# Patient Record
Sex: FEMALE | Race: BLACK OR AFRICAN AMERICAN | NOT HISPANIC OR LATINO | URBAN - METROPOLITAN AREA
[De-identification: names, ages, dates, MRNs, and addresses within clinical notes are randomized per-mention and may not be internally consistent; named-entity substitution may affect disease eponyms.]

---

## 2021-03-28 ENCOUNTER — HOSPITAL ENCOUNTER (OUTPATIENT)
Dept: RADIOLOGY | Facility: CLINIC | Age: 36
Discharge: HOME | End: 2021-03-28
Attending: STUDENT IN AN ORGANIZED HEALTH CARE EDUCATION/TRAINING PROGRAM
Payer: COMMERCIAL

## 2021-03-28 ENCOUNTER — OFFICE VISIT (OUTPATIENT)
Dept: URGENT CARE | Facility: CLINIC | Age: 36
End: 2021-03-28
Payer: COMMERCIAL

## 2021-03-28 ENCOUNTER — HOSPITAL ENCOUNTER (OUTPATIENT)
Dept: RADIOLOGY | Facility: CLINIC | Age: 36
Discharge: HOME | End: 2021-03-28
Attending: STUDENT IN AN ORGANIZED HEALTH CARE EDUCATION/TRAINING PROGRAM

## 2021-03-28 VITALS
HEART RATE: 94 BPM | WEIGHT: 135 LBS | OXYGEN SATURATION: 98 % | RESPIRATION RATE: 16 BRPM | DIASTOLIC BLOOD PRESSURE: 68 MMHG | SYSTOLIC BLOOD PRESSURE: 125 MMHG | HEIGHT: 64 IN | TEMPERATURE: 98 F | BODY MASS INDEX: 23.05 KG/M2

## 2021-03-28 DIAGNOSIS — S46.819A STRAIN OF TRAPEZIUS MUSCLE, UNSPECIFIED LATERALITY, INITIAL ENCOUNTER: ICD-10-CM

## 2021-03-28 DIAGNOSIS — M54.50 ACUTE MIDLINE LOW BACK PAIN WITHOUT SCIATICA: ICD-10-CM

## 2021-03-28 DIAGNOSIS — G44.319 ACUTE POST-TRAUMATIC HEADACHE, NOT INTRACTABLE: ICD-10-CM

## 2021-03-28 DIAGNOSIS — S69.92XA INJURY OF LEFT RING FINGER, INITIAL ENCOUNTER: ICD-10-CM
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
## 2021-03-28 PROCEDURE — 72100 X-RAY EXAM L-S SPINE 2/3 VWS: CPT | Mod: S$GLB,,, | Performed by: RADIOLOGY

## 2021-03-28 PROCEDURE — 72100 XR LUMBAR SPINE 2 OR 3 VIEWS: ICD-10-PCS | Mod: S$GLB,,, | Performed by: RADIOLOGY

## 2021-03-28 PROCEDURE — 99204 PR OFFICE/OUTPT VISIT, NEW, LEVL IV, 45-59 MIN: ICD-10-PCS | Mod: S$GLB,,, | Performed by: STUDENT IN AN ORGANIZED HEALTH CARE EDUCATION/TRAINING PROGRAM

## 2021-03-28 PROCEDURE — 1125F AMNT PAIN NOTED PAIN PRSNT: CPT | Mod: S$GLB,,, | Performed by: STUDENT IN AN ORGANIZED HEALTH CARE EDUCATION/TRAINING PROGRAM

## 2021-03-28 PROCEDURE — 1125F PR PAIN SEVERITY QUANTIFIED, PAIN PRESENT: ICD-10-PCS | Mod: S$GLB,,, | Performed by: STUDENT IN AN ORGANIZED HEALTH CARE EDUCATION/TRAINING PROGRAM

## 2021-03-28 PROCEDURE — 73140 X-RAY EXAM OF FINGER(S): CPT | Mod: S$GLB,,, | Performed by: RADIOLOGY

## 2021-03-28 PROCEDURE — 73140 XR FINGER 2 OR MORE VIEWS: ICD-10-PCS | Mod: S$GLB,,, | Performed by: RADIOLOGY

## 2021-03-28 PROCEDURE — 3008F PR BODY MASS INDEX (BMI) DOCUMENTED: ICD-10-PCS | Mod: CPTII,S$GLB,, | Performed by: STUDENT IN AN ORGANIZED HEALTH CARE EDUCATION/TRAINING PROGRAM

## 2021-03-28 PROCEDURE — 99204 OFFICE O/P NEW MOD 45 MIN: CPT | Mod: S$GLB,,, | Performed by: STUDENT IN AN ORGANIZED HEALTH CARE EDUCATION/TRAINING PROGRAM

## 2021-03-28 PROCEDURE — 3008F BODY MASS INDEX DOCD: CPT | Mod: CPTII,S$GLB,, | Performed by: STUDENT IN AN ORGANIZED HEALTH CARE EDUCATION/TRAINING PROGRAM

## 2021-03-28 RX ORDER — DICLOFENAC POTASSIUM 50 MG/1
50 TABLET, FILM COATED ORAL 2 TIMES DAILY PRN
Qty: 10 TABLET | Refills: 0 | Status: SHIPPED | OUTPATIENT
Start: 2021-03-28 | End: 2021-04-02

## 2021-03-28 RX ORDER — CYCLOBENZAPRINE HCL 5 MG
5-10 TABLET ORAL 3 TIMES DAILY PRN
Qty: 15 TABLET | Refills: 0 | Status: SHIPPED | OUTPATIENT
Start: 2021-03-28 | End: 2021-04-02

## 2025-04-19 ENCOUNTER — APPOINTMENT (OUTPATIENT)
Dept: LAB | Facility: HOSPITAL | Age: 40
End: 2025-04-19
Attending: PEDIATRICS
Payer: COMMERCIAL

## 2025-04-19 ENCOUNTER — OFFICE VISIT (OUTPATIENT)
Dept: INTERNAL MEDICINE | Facility: CLINIC | Age: 40
End: 2025-04-19
Payer: COMMERCIAL

## 2025-04-19 VITALS
DIASTOLIC BLOOD PRESSURE: 70 MMHG | TEMPERATURE: 98 F | OXYGEN SATURATION: 99 % | BODY MASS INDEX: 23.34 KG/M2 | HEIGHT: 64 IN | RESPIRATION RATE: 18 BRPM | SYSTOLIC BLOOD PRESSURE: 120 MMHG | HEART RATE: 89 BPM | WEIGHT: 136.69 LBS

## 2025-04-19 DIAGNOSIS — Z23 NEED FOR TDAP VACCINATION: ICD-10-CM

## 2025-04-19 DIAGNOSIS — Z01.419 WELL WOMAN EXAM: ICD-10-CM

## 2025-04-19 DIAGNOSIS — Z00.00 WELL ADULT EXAM: Primary | ICD-10-CM

## 2025-04-19 PROCEDURE — 3074F SYST BP LT 130 MM HG: CPT | Mod: CPTII,S$GLB,, | Performed by: PEDIATRICS

## 2025-04-19 PROCEDURE — 99385 PREV VISIT NEW AGE 18-39: CPT | Mod: 25,S$GLB,, | Performed by: PEDIATRICS

## 2025-04-19 PROCEDURE — 1160F RVW MEDS BY RX/DR IN RCRD: CPT | Mod: CPTII,S$GLB,, | Performed by: PEDIATRICS

## 2025-04-19 PROCEDURE — 90471 IMMUNIZATION ADMIN: CPT | Mod: S$GLB,,, | Performed by: PEDIATRICS

## 2025-04-19 PROCEDURE — 3008F BODY MASS INDEX DOCD: CPT | Mod: CPTII,S$GLB,, | Performed by: PEDIATRICS

## 2025-04-19 PROCEDURE — 99999 PR PBB SHADOW E&M-NEW PATIENT-LVL IV: CPT | Mod: PBBFAC,,, | Performed by: PEDIATRICS

## 2025-04-19 PROCEDURE — 90715 TDAP VACCINE 7 YRS/> IM: CPT | Mod: S$GLB,,, | Performed by: PEDIATRICS

## 2025-04-19 PROCEDURE — 1159F MED LIST DOCD IN RCRD: CPT | Mod: CPTII,S$GLB,, | Performed by: PEDIATRICS

## 2025-04-19 PROCEDURE — 3078F DIAST BP <80 MM HG: CPT | Mod: CPTII,S$GLB,, | Performed by: PEDIATRICS

## 2025-04-19 RX ORDER — CRISABOROLE 20 MG/G
OINTMENT TOPICAL
COMMUNITY
End: 2025-04-19

## 2025-04-19 RX ORDER — MEDROXYPROGESTERONE ACETATE 10 MG/1
10 TABLET ORAL DAILY
COMMUNITY
Start: 2025-03-14

## 2025-04-19 RX ORDER — PRENATAL SUPPLEMENT WITH DHA 1100; 30; 1.6; 1.8; 15; 2.5; .012; 1; .15; 20; 25; 2; 1000; 200; 20; 29 [IU]/1; MG/1; MG/1; MG/1; MG/1; MG/1; MG/1; MG/1; MG/1; MG/1; MG/1; MG/1; [IU]/1; MG/1; [IU]/1; MG/1
1 CAPSULE, GELATIN COATED ORAL DAILY
COMMUNITY

## 2025-04-19 NOTE — PROGRESS NOTES
Patient ID: Susana Irwin is a 39 y.o. female.    Chief Complaint: Establish Care    History of Present Illness    CHIEF COMPLAINT:  Patient presents today for a follow-up and to establish care    GASTROINTESTINAL:  She reports chronic abdominal pain for the past couple years, primarily associated with constipation. Initially, symptoms were severe with episodes of dizziness and vomiting during attempts to pass stool. She has implemented dietary modifications, including elimination of dairy and reduction in sugar intake, which has resulted in some improvement though she still experiences occasional pain. She previously tried fiber supplementation which has been discontinued.    GYNECOLOGICAL:  She has a history of prolonged menstrual bleeding managed with as-needed Provera, taken only during episodes of prolonged bleeding as directed by her gynecologist.    DERMATOLOGIC:  She has multiple moles present, including one on her hand and several in other locations, none of which are concerning. She also reports a history of scalp issues.    FAMILY HISTORY:  Her father has a history of heart disease, stroke, and brain aneurysm. Multiple family members have a history of diabetes.    PREVENTIVE CARE:  She denies history of mammogram screening. Last tetanus vaccination was approximately 10 years ago.    ALLERGIES:  She has latex allergy.    PMH, PSH, SH, FH reviewed with patient.      ROS:  General: -fever, -chills, -fatigue, -weight gain, -weight loss  Eyes: -vision changes, -redness, -discharge  ENT: -ear pain, -nasal congestion, -sore throat  Cardiovascular: -chest pain, -palpitations, -lower extremity edema  Respiratory: -cough, -shortness of breath  Gastrointestinal: +abdominal pain, -nausea, +vomiting, -diarrhea, +constipation, -blood in stool  Genitourinary: -dysuria, -hematuria, -frequency  Musculoskeletal: -joint pain, -muscle pain  Skin: -rash, -lesion  Neurological: -headache, +dizziness, -numbness,  -tingling  Psychiatric: -anxiety, -depression, -sleep difficulty  Female Genitourinary: +excessive vaginal bleeding  Allergic: +allergic reactions         Exam:  Physical Exam    General: No acute distress. Well-developed. Well-nourished.  Eyes: EOMI. Sclerae anicteric.  HENT: Normocephalic. Atraumatic. Nares patent. Moist oral mucosa.  Cardiovascular: Regular rate. Regular rhythm. No murmurs. No rubs. No gallops. Normal S1, S2.  Respiratory: Normal respiratory effort. Clear to auscultation bilaterally. No rales. No rhonchi. No wheezing.  Abdomen: Soft. Non-tender. Non-distended. Normoactive bowel sounds. No organomegaly.  Musculoskeletal: No  obvious deformity.  Extremities: No lower extremity edema.  Neurological: Alert & oriented x3. No slurred speech. Normal gait.  Psychiatric: Normal mood. Normal affect. Good insight. Good judgment.  Skin: Warm. Dry. No rash.         Assessment/Plan:  Well adult exam  -     Comprehensive Metabolic Panel; Future; Expected date: 04/19/2025  -     Lipid Panel; Future; Expected date: 04/19/2025  -     Hemoglobin A1C; Future; Expected date: 04/19/2025  -     TSH; Future; Expected date: 04/19/2025  -     CBC Auto Differential; Future; Expected date: 04/19/2025  -     Hepatitis C Antibody; Future; Expected date: 04/19/2025  -     C. trachomatis/N. gonorrhoeae by AMP DNA; Future; Expected date: 04/19/2025  -     HIV 1/2 Ag/Ab (4th Gen); Future; Expected date: 04/19/2025  -     Treponema Pallidium Antibodies IgG, IgM; Future; Expected date: 04/19/2025    Need for Tdap vaccination  -     DIPH,PERTUSS(ACEL),TET VAC(PF)(ADULT)(ADACEL)(TDaP)    Well woman exam  -     Ambulatory referral/consult to Gynecology; Future; Expected date: 04/26/2025         Assessment & Plan    K59.04 Chronic idiopathic constipation  N92.5 Other specified irregular menstruation  D22.9 Melanocytic nevi, unspecified  L29.9 Pruritus, unspecified  Z82.3 Family history of stroke  I67.1 Cerebral aneurysm,  nonruptured  Z82.49 Family history of ischemic heart disease and other diseases of the circulatory system  Z91.040 Latex allergy status    IMPRESSION:  - Established as patient's PCP to manage care remotely, acknowledging limitations of long-distance care.  - Abdominal pain and constipation not currently indicating need for colonoscopy given age and symptom presentation.  - Considered potential need for upper and lower endoscopy if symptoms worsen or persist.  - Skin evaluation found no significant moles.    CHRONIC CONSTIPATION:  - Recommend psyllium fiber supplement (generic brand) to promote gut health, regularity, and softer stools.  - Educated the patient on the benefits of psyllium fiber, including support for beneficial bacteria, regularity, and potential immunological and mood benefits.  - Instructed the patient to take the supplement regularly with a large glass of water to ensure proper stool bulking and softening.  - Noted that the patient has experienced abdominal pain and constipation for a few years, with previous severe symptoms including dizziness and vomiting.  - Acknowledged that the patient's dietary changes, including eliminating dairy and reducing sugar, have improved symptoms, but some pain persists.  - Performed abdominal exam, finding soft abdomen with no organomegaly.  - Assessed that current symptoms don't present warning signs requiring immediate concern.  - Considered the possibility of future upper and lower endoscopy if symptoms progress or don't improve.    IRREGULAR MENSTRUATION:  - Referred the patient to a gynecologist within the practice, considering the patient's preference for a male provider and focus on fertility preservation.  - Noted that the patient is taking Provera as prescribed by a gynecologist for prolonged bleeding during menstrual cycle.  - Acknowledged the need for GYN care and offered to set up an appointment with a gynecologist.    MELANOCYTIC NEVI:  - Examined the  patient's moles, including one on the hand, and found none to be significant.    PRURITUS:  - Noted the patient's mention of past scalp issues, possibly related to pruritus.  - Clarified that previous ointment prescription was not for scalp issues.    FAMILY HISTORY OF CARDIOVASCULAR CONDITIONS:  - Acknowledged the patient's family history of stroke.  - Acknowledged the patient's family history of brain aneurysm.  - Acknowledged the patient's family history of heart disease.    LATEX ALLERGY:  - Confirmed the patient's latex allergy.  - Discussed the severity of latex allergies, mentioning a colleague's extreme sensitivity.  - Informed the patient about the use of nitrile gloves instead of latex in the practice.    GENERAL HEALTH MAINTENANCE:  - Started daily multivitamin for women.  - Recommend annual influenza and COVID-19 vaccinations.  - Educated on potential effects: soreness, achiness, and mild fever.  - Provided information on typical age for starting mammogram screening (40 years).  - Administered tetanus booster due based on 10-year interval.  - Ordered comprehensive lab work including: Chemistry panel (electrolytes, renal function, liver tests), Lipid panel, He  moglobin A1C, Thyroid function test, CBC, Hepatitis C screening, STI screening.    FOLLOW-UP:  - Follow up after lab results are reviewed, which will be sent through the patient portal by Monday or Tuesday.  - Contact the office if lab results are abnormal for specific actions and to schedule a follow-up visit.  - Follow up in one year for annual follow-up if all results are normal.          Visit today included increased complexity associated with the care of the episodic problem  addressed and managing the longitudinal care of the patient due to the serious and/or complex managed problem(s) .      Follow up in about 1 year (around 4/19/2026).    This note was generated with the assistance of ambient listening technology. Verbal consent was  obtained by the patient and accompanying visitor(s) for the recording of patient appointment to facilitate this note. I attest to having reviewed and edited the generated note for accuracy, though some syntax or spelling errors may persist. Please contact the author of this note for any clarification.  Answers submitted by the patient for this visit:  Abdominal Pain Questionnaire (Submitted on 4/18/2025)  Chief Complaint: Abdominal pain  Chronicity: chronic  Onset: more than 1 year ago  Onset quality: gradual  Frequency: every several days  Episode duration: 1 Hours  Progression since onset: gradually improving  Pain location: LUQ  Pain - numeric: 2/10  Pain quality: aching, cramping, a sensation of fullness  anorexia: Yes  arthralgias: No  belching: Yes  constipation: Yes  diarrhea: No  dysuria: No  fever: No  flatus: Yes  frequency: Yes  headaches: No  hematochezia: No  hematuria: No  melena: No  nausea: No  weight loss: No  Aggravated by: bowel movement, certain positions, eating  Relieved by: activity, bowel movements, palpation, passing flatus  Pain severity: mild  Treatments tried: antacids  Improvement on treatment: no relief  abdominal surgery: No  colon cancer: No  Crohn's disease: No  gallstones: No  GERD: Yes  irritable bowel syndrome: Yes  kidney stones: No  pancreatitis: Yes  PUD: No  ulcerative colitis: No  UTI: No

## 2025-04-21 ENCOUNTER — RESULTS FOLLOW-UP (OUTPATIENT)
Dept: INTERNAL MEDICINE | Facility: CLINIC | Age: 40
End: 2025-04-21